# Patient Record
Sex: FEMALE | Race: WHITE | NOT HISPANIC OR LATINO | Employment: OTHER | URBAN - METROPOLITAN AREA
[De-identification: names, ages, dates, MRNs, and addresses within clinical notes are randomized per-mention and may not be internally consistent; named-entity substitution may affect disease eponyms.]

---

## 2023-06-09 LAB
BUDDING YEAST, URINE: PRESENT /HPF
RBC, URINE: ABNORMAL /HPF (ref 0–5)
RENAL EPITHELIAL CELLS, URINE: 1 /HPF
SQUAMOUS EPITHELIAL CELLS, URINE: 3 /HPF
TRANSITIONAL EPITHELIAL CELLS, URINE: <1 /HPF
WBC, URINE: >100 /HPF (ref 0–5)

## 2023-06-11 LAB — URINE CULTURE: ABNORMAL

## 2023-08-15 LAB
AMORPHOUS CRYSTALS, URINE: NORMAL /HPF
BACTERIA, URINE: NORMAL /HPF
BROAD CASTS, URINE: NORMAL /LPF
BUDDING YEAST, URINE: NORMAL /HPF
CALCIUM CARBONATE CRYSTALS, URINE: NORMAL /HPF
CALCIUM OXALATE CRYSTALS, URINE: NORMAL /HPF
CALCIUM PHOSPHATE CRYSTALS, URINE: NORMAL /HPF
CYSTINE CRYSTALS, URINE: NORMAL /HPF
EPITHELIAL CASTS, URINE: NORMAL /LPF
FAT, URINE: NORMAL /HPF
FATTY CASTS, URINE: NORMAL /LPF
FINE GRANULAR CASTS, URINE: NORMAL /LPF
HYALINE CASTS, URINE: NORMAL /LPF
LEUCINE CRYSTALS, URINE: NORMAL /HPF
MIXED CELLULAR CASTS, URINE: NORMAL /LPF
MUCUS, URINE: NORMAL /LPF
OVAL FAT BODIES, URINE: NORMAL /HPF
RBC CASTS, URINE: NORMAL /LPF
RBC CLUMPS, URINE: NORMAL /HPF
RBC, URINE: NORMAL
RENAL EPITHELIAL CELLS, URINE: NORMAL /HPF
SPERMATOZOA, URINE: NORMAL /HPF
SQUAMOUS EPITHELIAL CELLS, URINE: NORMAL /HPF
TRANSITIONAL EPITHELIAL CELLS, URINE: NORMAL /HPF
TRICHOMONAS, URINE: NORMAL /HPF
TRIPLE PHOSPHATE CRYSTALS, URINE: NORMAL /HPF
TYROSINE CRYSTALS, URINE: NORMAL /HPF
URIC ACID (URATE) CRYSTALS, URINE: NORMAL /HPF
URINALYSIS MICROSCOPIC COMMENT: NORMAL
URINE CULTURE: NORMAL
WAXY CASTS, URINE: NORMAL /LPF
WBC CASTS, URINE: NORMAL /LPF
WBC CLUMPS, URINE: NORMAL /HPF
WBC, URINE: NORMAL
YEAST HYPHAE, URINE: NORMAL /HPF

## 2023-08-16 LAB
BACTERIA, URINE: ABNORMAL /HPF
RBC, URINE: 18 /HPF (ref 0–5)
SQUAMOUS EPITHELIAL CELLS, URINE: <1 /HPF
WBC CLUMPS, URINE: ABNORMAL /HPF
WBC, URINE: >182 /HPF (ref 0–5)

## 2023-08-18 LAB — URINE CULTURE: ABNORMAL

## 2023-09-15 LAB
RBC, URINE: >100 /HPF (ref 0–5)
WBC, URINE: >100 /HPF (ref 0–5)

## 2023-09-17 LAB — URINE CULTURE: ABNORMAL

## 2023-09-27 PROBLEM — I25.10 CORONARY ARTERY DISEASE: Status: ACTIVE | Noted: 2023-09-27

## 2023-09-27 PROBLEM — B37.9 YEAST INFECTION: Status: ACTIVE | Noted: 2023-09-27

## 2023-09-27 PROBLEM — R30.0 DYSURIA: Status: ACTIVE | Noted: 2023-09-27

## 2023-09-27 PROBLEM — E66.9 OBESITY: Status: ACTIVE | Noted: 2023-09-27

## 2023-09-27 PROBLEM — R42 DIZZINESS: Status: ACTIVE | Noted: 2023-09-27

## 2023-09-27 PROBLEM — I10 HYPERTENSION: Status: ACTIVE | Noted: 2023-09-27

## 2023-09-27 PROBLEM — N39.0 RECURRENT UTI: Status: ACTIVE | Noted: 2023-09-27

## 2023-09-27 PROBLEM — N39.41 URGE INCONTINENCE: Status: ACTIVE | Noted: 2023-09-27

## 2023-09-27 PROBLEM — I73.9 CLAUDICATION IN PERIPHERAL VASCULAR DISEASE (CMS-HCC): Status: ACTIVE | Noted: 2023-09-27

## 2023-09-27 PROBLEM — Z93.59 SUPRAPUBIC CATHETER (MULTI): Status: ACTIVE | Noted: 2023-09-27

## 2023-09-27 PROBLEM — B37.31 YEAST VAGINITIS: Status: ACTIVE | Noted: 2023-09-27

## 2023-09-27 PROBLEM — R33.9 INCOMPLETE BLADDER EMPTYING: Status: ACTIVE | Noted: 2023-09-27

## 2023-09-27 PROBLEM — N32.81 OVERACTIVE BLADDER: Status: ACTIVE | Noted: 2023-09-27

## 2023-09-27 RX ORDER — ALBUTEROL SULFATE 90 UG/1
2 AEROSOL, METERED RESPIRATORY (INHALATION) EVERY 4 HOURS PRN
COMMUNITY
Start: 2020-06-23

## 2023-09-27 RX ORDER — FERROUS SULFATE 324(65)MG
1 TABLET, DELAYED RELEASE (ENTERIC COATED) ORAL DAILY
COMMUNITY
End: 2024-03-19 | Stop reason: ALTCHOICE

## 2023-09-27 RX ORDER — ESTRADIOL 0.1 MG/G
CREAM VAGINAL
COMMUNITY
Start: 2022-03-08

## 2023-09-27 RX ORDER — INSULIN LISPRO 100 [IU]/ML
INJECTION, SUSPENSION SUBCUTANEOUS
COMMUNITY
Start: 2015-11-09

## 2023-09-27 RX ORDER — SERTRALINE HYDROCHLORIDE 50 MG/1
1 TABLET, FILM COATED ORAL DAILY
COMMUNITY
Start: 2021-02-08

## 2023-09-27 RX ORDER — DORZOLAMIDE HCL 20 MG/ML
1 SOLUTION/ DROPS OPHTHALMIC 2 TIMES DAILY
COMMUNITY
Start: 2015-02-26

## 2023-09-27 RX ORDER — BLOOD SUGAR DIAGNOSTIC
STRIP MISCELLANEOUS
COMMUNITY
Start: 2021-04-23

## 2023-09-27 RX ORDER — WARFARIN SODIUM 5 MG/1
1 TABLET ORAL DAILY
COMMUNITY
Start: 2021-03-15

## 2023-09-27 RX ORDER — METHENAMINE, SODIUM PHOSPHATE, MONOBASIC, MONOHYDRATE, PHENYL SALICYLATE, METHYLENE BLUE, AND HYOSCYAMINE SULFATE 118; 40.8; 36; 10; .12 MG/1; MG/1; MG/1; MG/1; MG/1
1 CAPSULE ORAL 2 TIMES DAILY PRN
COMMUNITY
Start: 2022-08-05

## 2023-09-27 RX ORDER — LOSARTAN POTASSIUM 100 MG/1
50 TABLET ORAL DAILY
COMMUNITY
End: 2024-03-19 | Stop reason: ALTCHOICE

## 2023-09-27 RX ORDER — METHENAMINE HIPPURATE 1000 MG/1
1 TABLET ORAL 2 TIMES DAILY
COMMUNITY
Start: 2022-04-05

## 2023-09-27 RX ORDER — VALSARTAN 160 MG/1
1 TABLET ORAL DAILY
COMMUNITY
Start: 2022-03-15

## 2023-09-27 RX ORDER — SOLIFENACIN SUCCINATE 5 MG/1
10 TABLET, FILM COATED ORAL DAILY
COMMUNITY
Start: 2022-08-05 | End: 2023-10-02 | Stop reason: SDUPTHER

## 2023-09-27 RX ORDER — PRAVASTATIN SODIUM 40 MG/1
1 TABLET ORAL DAILY
COMMUNITY
Start: 2022-02-16

## 2023-09-27 RX ORDER — FUROSEMIDE 20 MG/1
10 TABLET ORAL DAILY PRN
COMMUNITY
Start: 2021-03-11

## 2023-09-27 RX ORDER — BRIMONIDINE TARTRATE 2 MG/ML
1 SOLUTION/ DROPS OPHTHALMIC 2 TIMES DAILY
COMMUNITY

## 2023-09-27 RX ORDER — PREDNISOLONE ACETATE 10 MG/ML
1 SUSPENSION/ DROPS OPHTHALMIC 4 TIMES DAILY
COMMUNITY
Start: 2022-01-19

## 2023-09-27 RX ORDER — NITROGLYCERIN 0.4 MG/1
0.4 TABLET SUBLINGUAL EVERY 5 MIN PRN
COMMUNITY
Start: 2020-09-08

## 2023-09-27 RX ORDER — TRAVOPROST OPHTHALMIC SOLUTION 0.04 MG/ML
1 SOLUTION OPHTHALMIC NIGHTLY
COMMUNITY
Start: 2015-06-02

## 2023-09-27 RX ORDER — PRAVASTATIN SODIUM 20 MG/1
1 TABLET ORAL DAILY
COMMUNITY
Start: 2015-12-14 | End: 2024-03-19 | Stop reason: ALTCHOICE

## 2023-09-27 RX ORDER — ASPIRIN 81 MG/1
1 TABLET ORAL DAILY
COMMUNITY

## 2023-09-27 RX ORDER — TIMOLOL MALEATE 5 MG/ML
1 SOLUTION/ DROPS OPHTHALMIC DAILY
COMMUNITY
Start: 2020-02-12

## 2023-09-27 RX ORDER — ACETAMINOPHEN 500 MG
1 TABLET ORAL DAILY
COMMUNITY

## 2023-09-27 RX ORDER — GABAPENTIN 600 MG/1
600 TABLET ORAL 2 TIMES DAILY
COMMUNITY
Start: 2021-02-08

## 2023-09-27 RX ORDER — ALBUTEROL SULFATE 0.83 MG/ML
2.5 SOLUTION RESPIRATORY (INHALATION)
COMMUNITY
Start: 2021-03-11

## 2023-09-27 RX ORDER — AZATHIOPRINE 50 MG/1
1 TABLET ORAL 3 TIMES DAILY
COMMUNITY

## 2023-09-27 RX ORDER — ROPINIROLE 0.25 MG/1
0.25 TABLET, FILM COATED ORAL 3 TIMES DAILY
COMMUNITY
Start: 2021-06-02

## 2023-09-27 RX ORDER — TRIMETHOPRIM 100 MG/1
1 TABLET ORAL DAILY
COMMUNITY
Start: 2022-05-27 | End: 2024-03-19 | Stop reason: ALTCHOICE

## 2023-09-27 RX ORDER — NETARSUDIL 0.2 MG/ML
1 SOLUTION/ DROPS OPHTHALMIC; TOPICAL NIGHTLY
COMMUNITY

## 2023-10-02 ENCOUNTER — OFFICE VISIT (OUTPATIENT)
Dept: UROLOGY | Facility: CLINIC | Age: 73
End: 2023-10-02
Payer: MEDICARE

## 2023-10-02 VITALS — DIASTOLIC BLOOD PRESSURE: 83 MMHG | SYSTOLIC BLOOD PRESSURE: 187 MMHG | HEART RATE: 49 BPM | TEMPERATURE: 96.9 F

## 2023-10-02 DIAGNOSIS — N39.41 URGE INCONTINENCE: ICD-10-CM

## 2023-10-02 DIAGNOSIS — N32.81 OAB (OVERACTIVE BLADDER): ICD-10-CM

## 2023-10-02 DIAGNOSIS — N39.0 RECURRENT UTI: Primary | ICD-10-CM

## 2023-10-02 LAB
POC APPEARANCE, URINE: CLEAR
POC BILIRUBIN, URINE: NEGATIVE
POC BLOOD, URINE: NEGATIVE
POC COLOR, URINE: YELLOW
POC GLUCOSE, URINE: NEGATIVE MG/DL
POC KETONES, URINE: NEGATIVE MG/DL
POC LEUKOCYTES, URINE: NEGATIVE
POC NITRITE,URINE: NEGATIVE
POC PH, URINE: 5.5 PH
POC PROTEIN, URINE: NORMAL MG/DL
POC SPECIFIC GRAVITY, URINE: 1.02
POC UROBILINOGEN, URINE: 0.2 EU/DL

## 2023-10-02 PROCEDURE — 81003 URINALYSIS AUTO W/O SCOPE: CPT | Performed by: NURSE PRACTITIONER

## 2023-10-02 PROCEDURE — 99214 OFFICE O/P EST MOD 30 MIN: CPT | Performed by: NURSE PRACTITIONER

## 2023-10-02 PROCEDURE — 1159F MED LIST DOCD IN RCRD: CPT | Performed by: NURSE PRACTITIONER

## 2023-10-02 PROCEDURE — 1126F AMNT PAIN NOTED NONE PRSNT: CPT | Performed by: NURSE PRACTITIONER

## 2023-10-02 PROCEDURE — 3079F DIAST BP 80-89 MM HG: CPT | Performed by: NURSE PRACTITIONER

## 2023-10-02 PROCEDURE — 3077F SYST BP >= 140 MM HG: CPT | Performed by: NURSE PRACTITIONER

## 2023-10-02 RX ORDER — VIBEGRON 75 MG/1
75 TABLET, FILM COATED ORAL DAILY
Qty: 30 TABLET | Refills: 2 | Status: SHIPPED | OUTPATIENT
Start: 2023-10-02 | End: 2023-12-31

## 2023-10-02 RX ORDER — SOLIFENACIN SUCCINATE 10 MG/1
10 TABLET, FILM COATED ORAL DAILY
Qty: 30 TABLET | Refills: 5 | Status: SHIPPED | OUTPATIENT
Start: 2023-10-02 | End: 2024-03-30

## 2023-10-02 ASSESSMENT — ENCOUNTER SYMPTOMS
LOSS OF SENSATION IN FEET: 1
HEADACHES: 1
OCCASIONAL FEELINGS OF UNSTEADINESS: 1
DEPRESSION: 0

## 2023-10-02 NOTE — PROGRESS NOTES
Subjective   Patient ID: Pham Lara is a 73 y.o. female who presents for   Follow-up of refractory OAB.  She has previously failed multiple medications as well as InterStim and Botox.  She had an SPT in June 2022 which was not effective for her.  At the present time she remains on Solifenacin.  She feels that symptoms are somewhat well controlled although she is wearing at least 3-4 pads per day.  She currently remains on Solifenacin 10 mg.  Denies difficulties with management.  Desires improvement.    History of recurrent UTIs, most recently in September.  Prefers not to use vaginal estradiol cream.  No longer on medications daily for prevention.    Undergoing evaluation with neurology at present time for dementia and increasing forgetfulness.      Review of Systems   Neurological:  Positive for headaches.   All other systems reviewed and are negative.      Objective   Physical Exam  Constitutional:       Appearance: Normal appearance. She is obese.   Cardiovascular:      Rate and Rhythm: Normal rate.   Pulmonary:      Effort: Pulmonary effort is normal.   Musculoskeletal:      Cervical back: Full passive range of motion without pain.   Psychiatric:         Behavior: Behavior is cooperative.         Thought Content: Thought content normal.         Cognition and Memory: Memory is impaired.   Utilizing wheelchair for mobility   Alert and oriented x3  Moist mucous membranes  Breathes easily on room air  Abdomen soft, nondistended. Obese  No edema  No scleral icterus        Assessment/Plan   Diagnoses and all orders for this visit:  Recurrent UTI  -     POCT UA Automated manually resulted  -     vibegron (Gemtesa) 75 mg tablet; Take 1 tablet (75 mg) by mouth once daily.  -     solifenacin (VESIcare) 10 mg tablet; Take 1 tablet (10 mg) by mouth once daily. Swallow tablet whole; do not crush, chew, or split.  -     Urinalysis Microscopic Only; Standing  -     Urine culture; Standing  OAB (overactive bladder)  -      vibegron (Gemtesa) 75 mg tablet; Take 1 tablet (75 mg) by mouth once daily.  -     solifenacin (VESIcare) 10 mg tablet; Take 1 tablet (10 mg) by mouth once daily. Swallow tablet whole; do not crush, chew, or split.  -     Urinalysis Microscopic Only; Standing  -     Urine culture; Standing  Urge incontinence  -     vibegron (Gemtesa) 75 mg tablet; Take 1 tablet (75 mg) by mouth once daily.  -     solifenacin (VESIcare) 10 mg tablet; Take 1 tablet (10 mg) by mouth once daily. Swallow tablet whole; do not crush, chew, or split.     Plan to add Gemtesa.  Discussed rationale.  Standing order for urinalysis placed.  Plan for follow-up in a year.  Patient verbalized understanding.

## 2023-10-20 ENCOUNTER — TELEPHONE (OUTPATIENT)
Dept: UROLOGY | Facility: CLINIC | Age: 73
End: 2023-10-20
Payer: MEDICARE

## 2023-10-20 NOTE — TELEPHONE ENCOUNTER
Patient called in stating the medication you sent her in is not working for her. She did not let me know which medication. Do you think its the Vesicare ?

## 2023-10-23 NOTE — TELEPHONE ENCOUNTER
Pts  called stating that the Gemtessa is too strong for her and after taking one pill, she felt pain in her stomach. They are calling for an alternative that may suit her better

## 2023-11-10 ENCOUNTER — TELEPHONE (OUTPATIENT)
Dept: UROLOGY | Facility: CLINIC | Age: 73
End: 2023-11-10
Payer: MEDICARE

## 2023-11-10 NOTE — TELEPHONE ENCOUNTER
Pt states she has a bad bladder infection with associated pain, she wants to know if you want a urine sample as she is requesting antibiotics

## 2023-11-13 ENCOUNTER — LAB (OUTPATIENT)
Dept: LAB | Facility: LAB | Age: 73
End: 2023-11-13
Payer: MEDICARE

## 2023-11-13 DIAGNOSIS — N39.0 RECURRENT UTI: ICD-10-CM

## 2023-11-13 DIAGNOSIS — N32.81 OAB (OVERACTIVE BLADDER): ICD-10-CM

## 2023-11-13 LAB
BACTERIA #/AREA URNS AUTO: ABNORMAL /HPF
RBC #/AREA URNS AUTO: >20 /HPF
WBC #/AREA URNS AUTO: >50 /HPF

## 2023-11-13 PROCEDURE — 87186 SC STD MICRODIL/AGAR DIL: CPT

## 2023-11-13 PROCEDURE — 87086 URINE CULTURE/COLONY COUNT: CPT

## 2023-11-13 PROCEDURE — 81001 URINALYSIS AUTO W/SCOPE: CPT

## 2023-11-15 LAB — BACTERIA UR CULT: ABNORMAL

## 2023-11-16 DIAGNOSIS — N39.0 RECURRENT UTI: Primary | ICD-10-CM

## 2023-11-16 RX ORDER — SULFAMETHOXAZOLE AND TRIMETHOPRIM 800; 160 MG/1; MG/1
1 TABLET ORAL 2 TIMES DAILY
Qty: 14 TABLET | Refills: 0 | Status: SHIPPED | OUTPATIENT
Start: 2023-11-16 | End: 2023-11-23

## 2023-11-16 RX ORDER — TRIMETHOPRIM 100 MG/1
100 TABLET ORAL DAILY
Qty: 90 TABLET | Refills: 1 | Status: SHIPPED | OUTPATIENT
Start: 2023-11-16 | End: 2024-03-19 | Stop reason: ALTCHOICE

## 2024-01-02 ENCOUNTER — APPOINTMENT (OUTPATIENT)
Dept: UROLOGY | Facility: CLINIC | Age: 74
End: 2024-01-02
Payer: MEDICARE

## 2024-03-19 ENCOUNTER — CONSULT (OUTPATIENT)
Dept: ALLERGY | Facility: CLINIC | Age: 74
End: 2024-03-19
Payer: MEDICARE

## 2024-03-19 VITALS — BODY MASS INDEX: 40.85 KG/M2 | WEIGHT: 238 LBS | OXYGEN SATURATION: 95 %

## 2024-03-19 DIAGNOSIS — R09.81 CONGESTION OF NASAL SINUS: Primary | ICD-10-CM

## 2024-03-19 PROCEDURE — 99214 OFFICE O/P EST MOD 30 MIN: CPT | Performed by: ALLERGY & IMMUNOLOGY

## 2024-03-19 PROCEDURE — 95024 IQ TESTS W/ALLERGENIC XTRCS: CPT | Performed by: ALLERGY & IMMUNOLOGY

## 2024-03-19 PROCEDURE — 95004 PERQ TESTS W/ALRGNC XTRCS: CPT | Performed by: ALLERGY & IMMUNOLOGY

## 2024-03-19 RX ORDER — CEPHALEXIN 500 MG/1
500 CAPSULE ORAL
COMMUNITY
Start: 2024-03-11

## 2024-03-19 RX ORDER — SENNOSIDES 8.6 MG/1
TABLET ORAL
COMMUNITY
Start: 2019-04-10

## 2024-03-19 RX ORDER — DONEPEZIL HYDROCHLORIDE 5 MG/1
TABLET, FILM COATED ORAL
COMMUNITY
Start: 2024-03-15

## 2024-03-19 RX ORDER — AMLODIPINE BESYLATE 5 MG/1
5 TABLET ORAL DAILY
COMMUNITY
Start: 2024-03-11

## 2024-03-19 RX ORDER — ATENOLOL 50 MG/1
TABLET ORAL
COMMUNITY
Start: 2006-05-24

## 2024-03-19 ASSESSMENT — ENCOUNTER SYMPTOMS: RHINORRHEA: 1

## 2024-03-19 NOTE — PATIENT INSTRUCTIONS
Skin testing is reactive to cat, dog and weeds.    Start over the counter Flonase Sensimist 2 sprays each side one time a day.  To increase the efficacy of your nasal spray, be sure to look down while using it.?  Spray slightly away from the direction of your nasal septum (the bone in the middle of your nose) and only sniff after you have sprayed - avoid spraying and sniffing at the same time, or else a lot of spray will go down your throat.    If no improvement in 4 weeks, call or send me a message so I can order a CT of your sinuses.

## 2024-03-19 NOTE — PROGRESS NOTES
Subjective   Patient ID:   [id number]   Pham Lara is a 73 y.o. female who presents for Allergies and Allergy Testing (NPV ).    Chief Complaint   Patient presents with    Allergies    Allergy Testing     NPV       This is a new patient, with a H/O CAD, HTN, recurrent UTI, presenting for evaluation of allergies and testing.  She is accompanied by her , who states patient is hard of hearing.    Patient reports longstanding sinus Sx treated with Zyrtec initially, which provided minimal relief.  She has year-round Sx.  She denies any ear issues other than hearing loss.  Patient wakes up having to blow her nose every morning.  She states she has one nostril smaller than the other but denies nasal congestion worse on one side compared to the other.  She never used nasal sprays because she does not like them.      Patient has no pets.  She has a dry basement in her home.    Patient uses albuterol as needed but she and her  note it is very rare.      Patient was hospitalized last week at  for 5 days due to elevated BP of 229, per .       is a patient here.    Review of Systems   HENT:  Positive for congestion, hearing loss and rhinorrhea.         One nostril smaller than the other.     Objective     Wt 108 kg (238 lb)   SpO2 95%   BMI 40.85 kg/m²      Physical Exam  Constitutional:       Appearance: Normal appearance.   HENT:      Head: Normocephalic and atraumatic.      Right Ear: External ear normal. There is no impacted cerumen.      Left Ear: External ear normal. There is no impacted cerumen.      Nose: Congestion (bilateral nasal turbinate edema, left worse than right) present. No rhinorrhea.   Eyes:      Extraocular Movements: Extraocular movements intact.      Conjunctiva/sclera: Conjunctivae normal.      Pupils: Pupils are equal, round, and reactive to light.   Cardiovascular:      Rate and Rhythm: Normal rate and regular rhythm.      Heart sounds: No murmur heard.     No friction  rub. No gallop.   Pulmonary:      Effort: No respiratory distress.      Breath sounds: No wheezing, rhonchi or rales.   Skin:     General: Skin is warm and dry.   Neurological:      Mental Status: She is alert.   Psychiatric:         Mood and Affect: Mood normal.         Behavior: Behavior normal.     Allergy testing was performed on [facility] using standard technique. There were no immediate complications.    Test Results  Controls  Positive Histamine: 5 x 5  Negative Saline: 0  Panel 1  Cat: 3  Cockroach: 0  Cotton: 0  Dog: 3  D. Farinae: 0  D. Pter: 0  Feather: 0  Phoma: 0  Tree Panel  Zeeshan: 0  Beech: 0  Birch: 0  [place]: 0  [place]: 0  Maple: 0  [place]: 0  Pine: 0  [place]: 0  Grass/Misc Tree  [place]: 0  Black [place]: 0  Titi: 0  [place] Blue: 0  [place] Fescue: 0  Orchard: 0  Red Top: 0  Rye Grass: 0  Sweet Vernal: 0  [place]: 0  Weeds  Cocklebur: 0  Common Mugwort: 0  English Plantain: 0  Hemp: 0  Kochia: 0  Lamb's Quarter: 0  Marsh Elder: 0  Pigweed: 0  Nettle: 0  Ragweed: 0  Molds  Alternaria: 0  Aspergillus: 0  Aureobasid: 0  Bipolaris: 0  Cladosporidium: 0  Epicoccum: 0  Fusarium: 0  Geotrichum: 0  Helminthosporium: 0  Penicillium: 0    Intradermal allergy testing was performed on [facility] using standard technique. There were no immediate complications.    Test Results  Controls  Intradermal Saline: 0  ID  Cockroach: 0  Common Weed Mix: 0  Cotton: 0  Feather: 0  KORT Mix: 0  Mite Mix: 0  Mold Mix #1: 0  Mold Mix #2: 0  Ragweed: 3+  Tree Mix: 0    Assessment/Plan         Non-seasonal allergic rhinitis  She only gets a little relief from Zyrtec. She will start Flonase sensimist. This is the least offensive nasal spray available. If she does not get better then she will get a CT scan of the sinus. Her allergies do not coincide with her symptoms. She does not have pet exposure so her perennial symptoms do not coincide.     By signing my name below, Lourdes ADAM Scribe, attest that this  documentation has been prepared under the direction and in the presence of Marianna Vega MD.  All medical record entries made by the Scribe were at my direction and personally dictated by me. I have reviewed the chart and agree that the record accurately reflects my personal performance of the history, physical exam, discussion and plan.

## 2024-03-20 PROBLEM — J30.89 NON-SEASONAL ALLERGIC RHINITIS: Status: ACTIVE | Noted: 2024-03-20

## 2024-03-20 RX ORDER — CETIRIZINE HYDROCHLORIDE 10 MG/1
10 TABLET ORAL DAILY
COMMUNITY

## 2024-03-20 NOTE — ASSESSMENT & PLAN NOTE
She only gets a little relief from Zyrtec. She will start Flonase sensimist. This is the least offensive nasal spray available. If she does not get better then she will get a CT scan of the sinus. Her allergies do not coincide with her symptoms. She does not have pet exposure so her perennial symptoms do not coincide.

## 2024-09-16 NOTE — PROGRESS NOTES
Subjective   Pham Lara  is a 74 y.o. female who presents for evaluation of left upper lobe lung nodule.    The patient was found to have lung nodules in the setting of left chest pain. She also has left pleural effusion. She received a biopsy which was non-diagnostic    Currently the patient is  reporting left chest pain and left shortness of breath with activity. This has been present for ~6 months . She denies the following symptoms: chest pain, shortness of breath at rest, cough, hemoptysis, fevers, chills, and weight loss.      She has s history of kidney cancer s/p surgery. Previous CABG.    She  reports that she has quit smoking. Her smoking use included cigarettes. She has never used smokeless tobacco. She reports that she does not use drugs.    Objective   Physical Exam  The patient is well-appearing and in no acute distress. The trachea is midline and there is no crepitus. The lungs were clear to auscultation grossly. There was good effort and excursion. The heart had a regular rate and rhythm. The abdomen was soft, nontender and nondistended. The extremities had no edema or gross deformities. Mood and affect are appropriate.  Diagnostic Studies  I have reviewed a pathology result pathology report which no diagnosis.     Assessment/Plan   I believe that the patient has a lung nodule of unclear etiology.     Reportedly, this patient has multiple lung nodules and a pleural effusion.  Unfortunately, I cannot review her radiographic imaging as it was not sent from [facility].  I will import her images into our PACS system and evaluate them.  The patient may be a candidate for IR based biopsy or surgical biopsy.  I will contact the patient to make this determination    I recommend  follow up with patient with a phone call after reviewing imaging.    I discussed this in detail with the patient, including a discussion of alternatives. They were comfortable with this approach.     Issa Beltrán,  MD  [phone]

## 2024-09-18 ENCOUNTER — OFFICE VISIT (OUTPATIENT)
Dept: SURGERY | Facility: CLINIC | Age: 74
End: 2024-09-18
Payer: MEDICARE

## 2024-09-18 VITALS
TEMPERATURE: 98.2 F | OXYGEN SATURATION: 95 % | DIASTOLIC BLOOD PRESSURE: 62 MMHG | HEART RATE: 64 BPM | SYSTOLIC BLOOD PRESSURE: 169 MMHG

## 2024-09-18 DIAGNOSIS — R91.1 LUNG NODULE: Primary | ICD-10-CM

## 2024-09-18 PROCEDURE — 1126F AMNT PAIN NOTED NONE PRSNT: CPT | Performed by: THORACIC SURGERY (CARDIOTHORACIC VASCULAR SURGERY)

## 2024-09-18 PROCEDURE — 1159F MED LIST DOCD IN RCRD: CPT | Performed by: THORACIC SURGERY (CARDIOTHORACIC VASCULAR SURGERY)

## 2024-09-18 PROCEDURE — 99203 OFFICE O/P NEW LOW 30 MIN: CPT | Performed by: THORACIC SURGERY (CARDIOTHORACIC VASCULAR SURGERY)

## 2024-09-18 PROCEDURE — 3077F SYST BP >= 140 MM HG: CPT | Performed by: THORACIC SURGERY (CARDIOTHORACIC VASCULAR SURGERY)

## 2024-09-18 PROCEDURE — 3078F DIAST BP <80 MM HG: CPT | Performed by: THORACIC SURGERY (CARDIOTHORACIC VASCULAR SURGERY)

## 2024-09-18 PROCEDURE — 1036F TOBACCO NON-USER: CPT | Performed by: THORACIC SURGERY (CARDIOTHORACIC VASCULAR SURGERY)

## 2024-09-18 PROCEDURE — 99213 OFFICE O/P EST LOW 20 MIN: CPT | Performed by: THORACIC SURGERY (CARDIOTHORACIC VASCULAR SURGERY)

## 2024-09-18 RX ORDER — AMMONIUM LACTATE 12 G/100G
LOTION TOPICAL
COMMUNITY
Start: 2024-08-16

## 2024-09-18 RX ORDER — CARVEDILOL 3.12 MG/1
3.12 TABLET ORAL
COMMUNITY
Start: 2024-08-16

## 2024-09-18 RX ORDER — MUPIROCIN 20 MG/G
OINTMENT TOPICAL
COMMUNITY
Start: 2024-08-26

## 2024-09-18 RX ORDER — FLUTICASONE PROPIONATE 250 UG/1
POWDER, METERED RESPIRATORY (INHALATION)
COMMUNITY

## 2024-09-18 RX ORDER — GRANULES FOR ORAL 3 G/1
POWDER ORAL
COMMUNITY
Start: 2022-10-20

## 2024-09-18 RX ORDER — NITROFURANTOIN (MACROCRYSTALS) 100 MG/1
CAPSULE ORAL EVERY 12 HOURS
COMMUNITY

## 2024-09-18 RX ORDER — SOLIFENACIN SUCCINATE 10 MG/1
TABLET, FILM COATED ORAL
COMMUNITY
Start: 2024-04-01

## 2024-09-18 RX ORDER — PRAVASTATIN SODIUM 20 MG/1
1 TABLET ORAL
COMMUNITY
Start: 2024-06-10

## 2024-09-18 RX ORDER — CLONIDINE HYDROCHLORIDE 0.1 MG/1
0.1 TABLET ORAL
COMMUNITY
Start: 2024-08-16

## 2024-09-18 RX ORDER — GALANTAMINE HYDROBROMIDE 8 MG/1
CAPSULE, EXTENDED RELEASE ORAL
COMMUNITY

## 2024-09-18 RX ORDER — FERROUS SULFATE 324(65)MG
TABLET, DELAYED RELEASE (ENTERIC COATED) ORAL
COMMUNITY

## 2024-09-18 RX ORDER — LATANOPROST 50 UG/ML
SOLUTION/ DROPS OPHTHALMIC
COMMUNITY
Start: 2024-08-27

## 2024-09-18 RX ORDER — ROSUVASTATIN CALCIUM 20 MG/1
20 TABLET, COATED ORAL
COMMUNITY
Start: 2024-08-16

## 2024-09-18 RX ORDER — ONDANSETRON 4 MG/1
TABLET, ORALLY DISINTEGRATING ORAL
COMMUNITY
Start: 2024-07-21

## 2024-09-18 RX ORDER — LOSARTAN POTASSIUM 100 MG/1
TABLET ORAL
COMMUNITY

## 2024-09-18 RX ORDER — HYDROCORTISONE 25 MG/G
OINTMENT TOPICAL
COMMUNITY
Start: 2024-08-26

## 2024-09-18 RX ORDER — LORAZEPAM 0.5 MG/1
TABLET ORAL
COMMUNITY
Start: 2023-01-26

## 2024-09-18 RX ORDER — SPIRONOLACTONE 25 MG/1
12.5 TABLET ORAL
COMMUNITY
Start: 2024-08-16

## 2024-09-18 RX ORDER — INSULIN LISPRO 100 [IU]/ML
INJECTION, SOLUTION INTRAVENOUS; SUBCUTANEOUS
COMMUNITY

## 2024-09-18 SDOH — ECONOMIC STABILITY: FOOD INSECURITY: WITHIN THE PAST 12 MONTHS, THE FOOD YOU BOUGHT JUST DIDN'T LAST AND YOU DIDN'T HAVE MONEY TO GET MORE.: NEVER TRUE

## 2024-09-18 SDOH — ECONOMIC STABILITY: FOOD INSECURITY: WITHIN THE PAST 12 MONTHS, YOU WORRIED THAT YOUR FOOD WOULD RUN OUT BEFORE YOU GOT MONEY TO BUY MORE.: NEVER TRUE

## 2024-09-18 ASSESSMENT — COLUMBIA-SUICIDE SEVERITY RATING SCALE - C-SSRS
6. HAVE YOU EVER DONE ANYTHING, STARTED TO DO ANYTHING, OR PREPARED TO DO ANYTHING TO END YOUR LIFE?: NO
1. IN THE PAST MONTH, HAVE YOU WISHED YOU WERE DEAD OR WISHED YOU COULD GO TO SLEEP AND NOT WAKE UP?: NO
2. HAVE YOU ACTUALLY HAD ANY THOUGHTS OF KILLING YOURSELF?: NO

## 2024-09-18 ASSESSMENT — PATIENT HEALTH QUESTIONNAIRE - PHQ9
1. LITTLE INTEREST OR PLEASURE IN DOING THINGS: NOT AT ALL
2. FEELING DOWN, DEPRESSED OR HOPELESS: NOT AT ALL
SUM OF ALL RESPONSES TO PHQ9 QUESTIONS 1 AND 2: 0

## 2024-09-18 ASSESSMENT — LIFESTYLE VARIABLES
HOW MANY STANDARD DRINKS CONTAINING ALCOHOL DO YOU HAVE ON A TYPICAL DAY: PATIENT DOES NOT DRINK
HOW OFTEN DO YOU HAVE A DRINK CONTAINING ALCOHOL: NEVER
HOW OFTEN DO YOU HAVE SIX OR MORE DRINKS ON ONE OCCASION: NEVER
SKIP TO QUESTIONS 9-10: 1
AUDIT-C TOTAL SCORE: 0

## 2024-09-18 ASSESSMENT — ENCOUNTER SYMPTOMS
LOSS OF SENSATION IN FEET: 1
OCCASIONAL FEELINGS OF UNSTEADINESS: 1
DEPRESSION: 0

## 2024-09-18 ASSESSMENT — PAIN SCALES - GENERAL: PAINLEVEL: 0-NO PAIN

## 2024-10-02 DIAGNOSIS — N32.81 OAB (OVERACTIVE BLADDER): ICD-10-CM

## 2024-10-02 DIAGNOSIS — N39.0 RECURRENT UTI: ICD-10-CM

## 2024-10-08 NOTE — PROGRESS NOTES
"Subjective   Pham Lara  is a 74 y.o. female who presents for evaluation of left upper lobe lung nodule.     The patient was found to have lung nodules in the setting of left chest pain. She also has left pleural effusion. She received a biopsy which was non-diagnostic.  I had recommended a PET/CT. She was hospitalized because she \"couldn't breathe\" and she was admitted. They treated her with steroids and diuretic medications. Dr. Amato has asked for a repeat biopsy.     She  reports that she has quit smoking. Her smoking use included cigarettes. She has never used smokeless tobacco. She reports that she does not currently use alcohol. She reports that she does not use drugs.    Objective   Physical Exam  Phone visit  Diagnostic Studies  I have reviewed a CT which shows a left lower lobe mass and loculated pleural effusion    Assessment/Plan   I believe that the patient has a diagnosis of lung mass and pleural effusion .     Etiology of these findings is concerning for neoplastic process,.  I believe percutaneous biopsy should be attempted to obtain a diagnosis.  I believe a PET/CT would be valuable to ascertain the best location to biopsy.  I encouraged the patient to obtain her PET/CT and this could be used for guiding a biopsy.  I personally preferring the biopsy be performed at [facility].    I recommend PET CT and IR Biopsy based on PET result (her PET is rescheduled for Tues next week)    I discussed this in detail with the patient, including a discussion of alternatives. They were comfortable with this approach.     Issa Beltrán MD  [phone]  Time 8 min    "

## 2024-10-10 ENCOUNTER — TELEMEDICINE (OUTPATIENT)
Dept: SURGERY | Facility: HOSPITAL | Age: 74
End: 2024-10-10
Payer: MEDICARE

## 2024-10-10 DIAGNOSIS — R91.1 LUNG NODULE: Primary | ICD-10-CM

## 2024-10-10 PROCEDURE — 99441 PR PHYS/QHP TELEPHONE EVALUATION 5-10 MIN: CPT | Performed by: THORACIC SURGERY (CARDIOTHORACIC VASCULAR SURGERY)

## 2024-10-22 ENCOUNTER — TELEPHONE (OUTPATIENT)
Dept: CARDIOTHORACIC SURGERY | Facility: HOSPITAL | Age: 74
End: 2024-10-22
Payer: MEDICARE

## 2024-10-22 DIAGNOSIS — R91.8 LUNG MASS: ICD-10-CM

## 2024-10-22 NOTE — TELEPHONE ENCOUNTER
"I called the patient to discuss the PET. I am concerned that the Pet suggests pleural metastatic disease. The ideal method to make this diagnosis would  be pleuroscopy. Unfortunately this would be an \"invasive\" procedure relative to a CT biopsy, which might impart more risk and discomfort. The alternative would be to try a 2nd IR biopsy. I discussed the risks, benefits and alternatives to each and the patient preferred another attempt at IR biopsy at [facility]. My office will arrange.   "

## 2024-10-30 NOTE — NURSING NOTE
Spoke with Dr Chowdhury office 10/30/24. Per Dr. Chowdhury ok for patient to hold coumadin  for 4 days prior to scheduled lung biopsy 11/5/24. Ok to hold ASA for 5 days prior to scheduled lung biopsy 11/5/24.

## 2024-11-05 ENCOUNTER — HOSPITAL ENCOUNTER (OUTPATIENT)
Dept: RADIOLOGY | Facility: HOSPITAL | Age: 74
Discharge: HOME | End: 2024-11-05
Payer: MEDICARE

## 2024-11-05 VITALS
TEMPERATURE: 97.7 F | SYSTOLIC BLOOD PRESSURE: 127 MMHG | HEART RATE: 54 BPM | DIASTOLIC BLOOD PRESSURE: 66 MMHG | RESPIRATION RATE: 16 BRPM | OXYGEN SATURATION: 97 %

## 2024-11-05 DIAGNOSIS — R91.8 LUNG MASS: ICD-10-CM

## 2024-11-05 LAB
INR PPP: 1.2 (ref 0.9–1.1)
PLATELET # BLD AUTO: 211 X10*3/UL (ref 150–450)
PROTHROMBIN TIME: 13.2 SECONDS (ref 9.8–12.8)

## 2024-11-05 PROCEDURE — 71045 X-RAY EXAM CHEST 1 VIEW: CPT | Performed by: RADIOLOGY

## 2024-11-05 PROCEDURE — 36415 COLL VENOUS BLD VENIPUNCTURE: CPT | Performed by: RADIOLOGY

## 2024-11-05 PROCEDURE — 2720000007 HC OR 272 NO HCPCS

## 2024-11-05 PROCEDURE — 32408 CORE NDL BX LNG/MED PERQ: CPT

## 2024-11-05 PROCEDURE — 85610 PROTHROMBIN TIME: CPT | Performed by: RADIOLOGY

## 2024-11-05 PROCEDURE — 7100000009 HC PHASE TWO TIME - INITIAL BASE CHARGE

## 2024-11-05 PROCEDURE — C1819 TISSUE LOCALIZATION-EXCISION: HCPCS

## 2024-11-05 PROCEDURE — 99152 MOD SED SAME PHYS/QHP 5/>YRS: CPT

## 2024-11-05 PROCEDURE — 85049 AUTOMATED PLATELET COUNT: CPT | Performed by: RADIOLOGY

## 2024-11-05 PROCEDURE — 2500000005 HC RX 250 GENERAL PHARMACY W/O HCPCS: Performed by: RADIOLOGY

## 2024-11-05 PROCEDURE — 2500000004 HC RX 250 GENERAL PHARMACY W/ HCPCS (ALT 636 FOR OP/ED): Performed by: RADIOLOGY

## 2024-11-05 PROCEDURE — 7100000010 HC PHASE TWO TIME - EACH INCREMENTAL 1 MINUTE

## 2024-11-05 PROCEDURE — 71045 X-RAY EXAM CHEST 1 VIEW: CPT

## 2024-11-05 RX ORDER — FENTANYL CITRATE 50 UG/ML
INJECTION, SOLUTION INTRAMUSCULAR; INTRAVENOUS
Status: COMPLETED | OUTPATIENT
Start: 2024-11-05 | End: 2024-11-05

## 2024-11-05 RX ORDER — LIDOCAINE HYDROCHLORIDE 10 MG/ML
INJECTION, SOLUTION EPIDURAL; INFILTRATION; INTRACAUDAL; PERINEURAL
Status: COMPLETED | OUTPATIENT
Start: 2024-11-05 | End: 2024-11-05

## 2024-11-05 ASSESSMENT — PAIN SCALES - GENERAL
PAINLEVEL_OUTOF10: 0 - NO PAIN
PAINLEVEL_OUTOF10: 0 - NO PAIN
PAINLEVEL_OUTOF10: 3
PAINLEVEL_OUTOF10: 0 - NO PAIN

## 2024-11-05 ASSESSMENT — PAIN - FUNCTIONAL ASSESSMENT
PAIN_FUNCTIONAL_ASSESSMENT: 0-10

## 2024-11-05 NOTE — PROCEDURES
Interventional Radiology Brief Postprocedure Note    Attending: Cornelius Moseley MD    Assistant:   Staff Role   Olamide Benavides, RN Radiology Nurse   Mario Aj Radiology Technologist   Cornelius Moseley MD Radiologist       Diagnosis:   1. Lung mass  CT guided percutaneous biopsy lung    CT guided percutaneous biopsy lung    Surgical Pathology Exam    Surgical Pathology Exam          Description of procedure: CT guided percutaneous biopsy lung 20 G x3    Timeout:  Yes    Procedure Area: Procedure Area     Anesthesia:   Conscious Sedation    Complications: None    Estimated Blood Loss: minimal    Medications (Filter: Administrations occurring from 1109 to 1245 on 11/05/24) As of 11/05/24 1245      oxygen (O2) therapy (L/min) Total volume:  Not documented*   *Total volume has not been documented. View each administration to see the amount administered.     Date/Time Rate/Dose/Volume Action       11/05/24  1112 3 L/min - 180,000 mL/hr New Bag               lidocaine PF (Xylocaine) 10 mg/mL (1 %) injection (mL) Total volume:  10 mL      Date/Time Rate/Dose/Volume Action       11/05/24  1229 10 mL Given               fentaNYL PF (Sublimaze) injection (mcg) Total dose:  25 mcg      Date/Time Rate/Dose/Volume Action       11/05/24  1232 25 mcg Given                   ID Type Source Tests Collected by Time   1 : left lingular core mass Tissue LUNG BIOPSY LEFT (SPECIFY SITE) SURGICAL PATHOLOGY EXAM Cornelius Moseley MD 11/5/2024 1223         See detailed result report with images in PACS.    The patient tolerated the procedure well without incident or complication and is in stable condition.

## 2024-11-18 ENCOUNTER — TELEPHONE (OUTPATIENT)
Dept: CARDIOTHORACIC SURGERY | Facility: HOSPITAL | Age: 74
End: 2024-11-18
Payer: MEDICARE

## 2024-11-18 DIAGNOSIS — C64.9: ICD-10-CM

## 2024-11-18 LAB
LAB AP ASR DISCLAIMER: NORMAL
LABORATORY COMMENT REPORT: NORMAL
PATH REPORT.COMMENTS IMP SPEC: NORMAL
PATH REPORT.FINAL DX SPEC: NORMAL
PATH REPORT.GROSS SPEC: NORMAL
PATH REPORT.TOTAL CANCER: NORMAL

## 2024-11-18 NOTE — TELEPHONE ENCOUNTER
"Result Communication    Resulted Orders   Surgical Pathology Exam   Result Value Ref Range    Case Report       Surgical Pathology                                Case: [id number]                                  Authorizing Provider:  Issa Beltrán MD     Collected:           11/05/2024 1223              Ordering Location:     [facility] [place]    Received:            11/05/2024 1411              Pathologist:           Stan Brownlee MD                                                              Specimen:    LUNG BIOPSY LEFT (SPECIFY SITE), left lingular core mass                                   FINAL DIAGNOSIS       A. LUNG, LEFT LINGULA; BIOPSY:  -- Highly atypical cellular infiltrate with clear cell features, compatible with metastatic renal cell carcinoma. See comment                By the signature on this report, the individual or group listed as making the Final Interpretation/Diagnosis certifies that they have reviewed this case.       Comment       The atypical cells are immunohistochemically positive for PAX8, CAM5.2, CA9, and EMA, while they are negative for CD45, CD68, and CK7. The immunoprofile is compatible with a metastatic renal cell carcinoma. Clinical correlation is recommended.    This case was reviewed at the Thoracic Pathology Consensus Conference (11/14/2024).    Dr. Devon Ocampo and Dr. Selina Dawson reviewed slides of this case in consultation and concur with the diagnosis.        Gross Description       A: Received in formalin, labeled with the patient's name and hospital number and \"left lingular cord mass\", are two cylindrical fragments of white pink soft tissue measuring 0.2 cm, and 0.9 cm in length by less than 0.1 cm in diameter. The specimen is submitted in toto in one cassette.  [facility]      Disclaimer       One or more of the reagents used to perform assays on this specimen MAY have contained components considered to be analyte specific reagents (ASR's).  ASR's have not been " cleared or approved by the U.S. Food and Drug Administration.  These assays were developed and their performance characteristics determined by the Department of Pathology at [facility]. The FDA does not require this test to go through premarket FDA review. This test is used for clinical purposes. It should not be regarded as investigational or for research. This laboratory is certified under the Clinical Laboratory Improvement Amendments (CLIA) as qualified to perform high complexity clinical laboratory testing.  The assays were performed with appropriate positive and negative controls which stained appropriately.         3:39 PM      Results were successfully communicated with the patient and they acknowledged their understanding. I will place a referral for medical oncology referral. I asked her to call my office for any questions or concerns.

## 2024-11-27 ENCOUNTER — TELEPHONE (OUTPATIENT)
Dept: HEMATOLOGY/ONCOLOGY | Facility: CLINIC | Age: 74
End: 2024-11-27
Payer: MEDICARE

## 2024-11-27 NOTE — PROGRESS NOTES
INITIAL CONSULTATION: GENITOURINARY CLINIC    PCP:   Urologist: Carla Gonzalez MD    Diagnosis: metastatic renal cell carcinoma, clear cell  Current treatment: discussion today    Oncologic history:  - RCC s/p surgery    Mutations:    Pathology:    Imaging:     HPI:    PMH:  PAN on imuran    PSH:  CABG    Family history:    Social history:    ROS: Review of Systems - Oncology    PE:  Physical Exam    Assessment:    Plan:

## 2024-11-27 NOTE — TELEPHONE ENCOUNTER
Called patient for new patient introduction and screening. Patient scheduled to see Dr. Hitchcock on 12/16/2024. Patient states that she is seeing Dr. Amato at [facility] and that she does not need to see Dr. Hitchcock at this time. Patient denies need for new patient visit on 12/16/2024. New Patient visit canceled. Informed patient to call and follow up as needed. Patient verbalized understanding and is in agreement with plan.

## 2024-12-16 ENCOUNTER — APPOINTMENT (OUTPATIENT)
Dept: HEMATOLOGY/ONCOLOGY | Facility: CLINIC | Age: 74
End: 2024-12-16
Payer: MEDICARE

## 2025-03-18 LAB — NON-UH HIE HGB A1C: 5.3 %

## 2025-08-20 LAB
NON-UH HIE APPEARANCE, U: ABNORMAL
NON-UH HIE BILIRUBIN, U: ABNORMAL
NON-UH HIE BLOOD, U: ABNORMAL
NON-UH HIE COLOR, U: ABNORMAL
NON-UH HIE GLUCOSE QUAL, U: ABNORMAL
NON-UH HIE KETONES, U: ABNORMAL
NON-UH HIE LEUKOCYTE ESTERASE, U: ABNORMAL
NON-UH HIE NITRITE, U: ABNORMAL
NON-UH HIE PH, U: 7 (ref 4.5–8)
NON-UH HIE PROTEIN, U: ABNORMAL
NON-UH HIE SPECIFIC GRAVITY, U: 1 (ref 1–1.03)
NON-UH HIE SQUAMOUS EPITHELIAL CELLS, U: 1 #/HPF
NON-UH HIE U MICRO: ABNORMAL
NON-UH HIE UROBILINOGEN QUAL, U: ABNORMAL
NON-UH HIE WBC/HPF, U: 3 #/HPF (ref 0–5)